# Patient Record
Sex: FEMALE | Race: WHITE | NOT HISPANIC OR LATINO | Employment: OTHER | URBAN - METROPOLITAN AREA
[De-identification: names, ages, dates, MRNs, and addresses within clinical notes are randomized per-mention and may not be internally consistent; named-entity substitution may affect disease eponyms.]

---

## 2020-11-17 ENCOUNTER — TELEPHONE (OUTPATIENT)
Dept: NEUROSURGERY | Facility: CLINIC | Age: 60
End: 2020-11-17

## 2020-11-17 NOTE — TELEPHONE ENCOUNTER
Provider:  Semaj  Caller: [facility]  Time of call:   3:49pm  Phone #:  [phone]  Surgery:    Surgery Date:    Last visit:     Next visit:     SHANNON:         Reason for call:  [facility] called stating the patient in there for an MRI currently. Wanting to know if it is safe for the patient to have an MRI due to the procedure and the coil the patient had placed. Would like a call back.

## 2021-01-19 ENCOUNTER — OFFICE VISIT (OUTPATIENT)
Dept: NEUROSURGERY | Facility: CLINIC | Age: 61
End: 2021-01-19

## 2021-01-19 VITALS
DIASTOLIC BLOOD PRESSURE: 84 MMHG | HEIGHT: 67 IN | HEART RATE: 83 BPM | BODY MASS INDEX: 23.1 KG/M2 | WEIGHT: 147.2 LBS | SYSTOLIC BLOOD PRESSURE: 124 MMHG | RESPIRATION RATE: 15 BRPM | OXYGEN SATURATION: 98 %

## 2021-01-19 DIAGNOSIS — I72.9 ANEURYSM (HCC): ICD-10-CM

## 2021-01-19 DIAGNOSIS — G44.229 CHRONIC TENSION-TYPE HEADACHE, NOT INTRACTABLE: Primary | ICD-10-CM

## 2021-01-19 PROCEDURE — 99204 OFFICE O/P NEW MOD 45 MIN: CPT | Performed by: PHYSICIAN ASSISTANT

## 2021-01-19 RX ORDER — ASPIRIN 81 MG/1
81 TABLET ORAL DAILY
COMMUNITY

## 2021-01-19 RX ORDER — UREA 10 %
LOTION (ML) TOPICAL
COMMUNITY

## 2021-01-19 NOTE — PROGRESS NOTES
Patient: Lisa Chua  : 1960    Primary Care Provider: Kiah Nixon APRN      Chief Complaint: Headache and right neck pain    History of Present Illness:       Patient is a very sweet 60-year-old female smoker who has a very colorful history with neurosurgery.  About a decade ago patient had a left ophthalmic artery aneurysm coiled.  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  Following those procedures patient was known to have right-sided trigeminal neuralgia and underwent a right-sided posterior fossa craniotomy for trigeminal nerve decompression.  Patient has not been seen back for close to 5 years.     Incision is well-healed in the back of her head but she complains of having a spell in  where she is having blurry vision and overall generalized fatigue this lasted for about 2 or 3 days she did not seek medical care at that time but since then started having headaches and right neck pain.    Patient states she gets a little bit of relief with ibuprofen and she alternates that with Tylenol to decrease amount of medicine she is taking.  Patient takes only an aspirin a day for her generalized medicines.    Patient represents with MRI for evaluation of her headaches.  I reviewed the MRI with Dr. Smith and as previously noted on historical MRIs patient does have a left occipital area stroke but no contrast-enhancing in the area of the aneurysms which would indicate no regrowth of the aneurysm.  Also reassured the patient that pipelines have never shown regrowth of any aneurysm when having a successfully deployed pipeline.    Review of Systems   Constitutional: Negative for activity change, appetite change, chills, fatigue and fever.   HENT: Negative for congestion, dental problem, ear pain, hearing loss, sinus pressure and tinnitus.    Eyes: Negative for pain and  "redness.   Respiratory: Negative for apnea, cough, shortness of breath and wheezing.    Cardiovascular: Negative for chest pain, palpitations and leg swelling.   Gastrointestinal: Negative for abdominal distention, abdominal pain, blood in stool, constipation, diarrhea, nausea and vomiting.   Endocrine: Negative for cold intolerance, heat intolerance and polyuria.   Genitourinary: Negative for enuresis, frequency and urgency.   Musculoskeletal: Positive for neck pain and neck stiffness.   Skin: Negative for color change and rash.   Neurological: Positive for headaches. Negative for dizziness, tremors, seizures, syncope, speech difficulty, weakness, light-headedness and numbness.   Psychiatric/Behavioral: Negative for behavioral problems and confusion. The patient is not nervous/anxious.        Past Medical History:     Past Medical History:   Diagnosis Date   • Arthritis    • Asthma    • Migraine        Family History:   History reviewed. No pertinent family history.    Social History:    reports that she has been smoking cigarettes. She has been smoking about 1.00 pack per day. She does not have any smokeless tobacco history on file.   SMOKING STATUS: I spent greater than 10 minutes educating the patient on smoking cessation, and discussed how smoking pertains to the particular disease process at hand.  The patient acknowledges understanding.      Surgical History:     Past Surgical History:   Procedure Laterality Date   • BRAIN SURGERY      x 13       Allergies:   Patient has no known allergies.    Physical Exam:    Vital Signs:/84   Pulse 83   Resp 15   Ht 170.2 cm (67\")   Wt 66.8 kg (147 lb 3.2 oz)   SpO2 98%   BMI 23.05 kg/m²    BMI: Body mass index is 23.05 kg/m².     GENERAL:           The patient is in no acute distress, and is able to answer all questions appropriately.    Neck:          Supple without lymphadenopathy    Cardiovascular:       Peripheral pulses 2+ at dorsalis pedis and anterior " tibialis    Lungs:         Breathing unlabored    Musculoskeletal:            strength is 5 out of 5 bilaterally.        Shoulder abduction is 5 out of 5.         Dorsiflexion is 5/5 Bilaterally       Plantarflexion is 5/5 bilaterally       Hip Flexion 5/5 bilaterally.         The patient´s gait is normal without antalgia.  No sign of Diaz's or clonus    Neurologic:          The patient is alert and oriented by 3.          Pupils are equal and reactive to light.         Visual fields are full.         Extraocular movements are intact without nystagmus.         There is no evidence of central motor drift. No facial droop.  No difficulty with rapid alternating movements.         Sensation is equal bilaterally with no deficit.           Reflexes:  2+ through out    CRANIAL NERVES:         Cranial nerve II: Visual fields are full to confrontation.       Cranial nerves III, IV and VI: PERRLA DC.  Extraocular movements are intact.  Nystagmus is not present.       Cranial nerve V: Facial sensation is intact to light touch.       Cranial nerve VII: Muscles of facial expression revealed no asymmetry.       Cranial nerve VIII: Hearing is intact to finger rub bilaterally.       Cranial nerve IX and X: Palate elevates symmetrically.        Cranial nerve XI: Shoulder shrug is intact.       Cranial nerve XII: Tongue is midline without evidence of Atrophy or fasciculation.    Incision is well-healed well approximated no sign of infection bleeding erythema    Medical Decision Making    Data Review:     MRI of the brain reviewed and discussed with Dr. Smith.  Dr. Mcgarry also reviewed said that the MRI looks stable as compared to the ones previously.  Nothing intracranial causing her headaches    Diagnosis:   Headache  History of intercerebral aneurysm x2  History of right-sided trigeminal neuralgia   Status post right posterior fossa craniotomy for decompression    Treatment Options:   Patient is currently doing well and  having signs and symptoms of headache.  Neurologist back home told her that she had a stroke on her MRI but unfortunately the stroke that he identified there is one that has been there and has been stable for some time.  There is nothing on her scan that shows she would be having headaches from anything intracranially.  I have referred her to Dr. Kate to discuss her headaches and management thereof.    If she were to continue have symptoms we would be happy to repeat an diagnostic angiogram, but it is not necessary at this time.    Patient will call us back on an as-needed basis    Patient's Body mass index is 23.05 kg/m². BMI is within normal parameters. No follow-up required..     Diagnosis Plan   1. Chronic tension-type headache, not intractable  Ambulatory Referral to Neurology   2. Aneurysm (CMS/[facility])

## 2025-06-08 ENCOUNTER — HOSPITAL ENCOUNTER (EMERGENCY)
Facility: HOSPITAL | Age: 65
Discharge: HOME | End: 2025-06-08
Attending: EMERGENCY MEDICINE | Admitting: EMERGENCY MEDICINE
Payer: MEDICARE

## 2025-06-08 ENCOUNTER — APPOINTMENT (OUTPATIENT)
Facility: HOSPITAL | Age: 65
End: 2025-06-08
Payer: MEDICARE

## 2025-06-08 VITALS
WEIGHT: 140 LBS | HEART RATE: 109 BPM | HEIGHT: 66 IN | DIASTOLIC BLOOD PRESSURE: 94 MMHG | BODY MASS INDEX: 22.5 KG/M2 | SYSTOLIC BLOOD PRESSURE: 145 MMHG | TEMPERATURE: 98.5 F | OXYGEN SATURATION: 95 % | RESPIRATION RATE: 18 BRPM

## 2025-06-08 DIAGNOSIS — S61.259A ANIMAL BITE OF FINGER, INITIAL ENCOUNTER: ICD-10-CM

## 2025-06-08 DIAGNOSIS — S62.631B OPEN DISPLACED FRACTURE OF DISTAL PHALANX OF LEFT INDEX FINGER, INITIAL ENCOUNTER: Primary | ICD-10-CM

## 2025-06-08 PROCEDURE — 25010000002 LIDOCAINE PF 1% 1 % SOLUTION: Performed by: EMERGENCY MEDICINE

## 2025-06-08 PROCEDURE — 73140 X-RAY EXAM OF FINGER(S): CPT

## 2025-06-08 PROCEDURE — 96365 THER/PROPH/DIAG IV INF INIT: CPT

## 2025-06-08 PROCEDURE — 90715 TDAP VACCINE 7 YRS/> IM: CPT | Performed by: EMERGENCY MEDICINE

## 2025-06-08 PROCEDURE — 25010000002 TETANUS-DIPHTH-ACELL PERTUSSIS 5-2.5-18.5 LF-MCG/0.5 SUSPENSION PREFILLED SYRINGE: Performed by: EMERGENCY MEDICINE

## 2025-06-08 PROCEDURE — 25010000002 AMPICILLIN-SULBACTAM PER 1.5 G: Performed by: EMERGENCY MEDICINE

## 2025-06-08 PROCEDURE — 90471 IMMUNIZATION ADMIN: CPT | Performed by: EMERGENCY MEDICINE

## 2025-06-08 PROCEDURE — 99283 EMERGENCY DEPT VISIT LOW MDM: CPT | Performed by: EMERGENCY MEDICINE

## 2025-06-08 RX ORDER — LIDOCAINE HYDROCHLORIDE 10 MG/ML
10 INJECTION, SOLUTION EPIDURAL; INFILTRATION; INTRACAUDAL; PERINEURAL ONCE
Status: COMPLETED | OUTPATIENT
Start: 2025-06-08 | End: 2025-06-08

## 2025-06-08 RX ORDER — ONDANSETRON 4 MG/1
4 TABLET, ORALLY DISINTEGRATING ORAL EVERY 8 HOURS PRN
Qty: 9 TABLET | Refills: 0 | Status: SHIPPED | OUTPATIENT
Start: 2025-06-08

## 2025-06-08 RX ORDER — HYDROCODONE BITARTRATE AND ACETAMINOPHEN 7.5; 325 MG/1; MG/1
1 TABLET ORAL EVERY 6 HOURS PRN
Qty: 12 TABLET | Refills: 0 | Status: SHIPPED | OUTPATIENT
Start: 2025-06-08

## 2025-06-08 RX ORDER — FLUCONAZOLE 150 MG/1
150 TABLET ORAL ONCE
Qty: 1 TABLET | Refills: 0 | Status: SHIPPED | OUTPATIENT
Start: 2025-06-08 | End: 2025-06-08

## 2025-06-08 RX ORDER — SODIUM CHLORIDE 0.9 % (FLUSH) 0.9 %
10 SYRINGE (ML) INJECTION AS NEEDED
Status: DISCONTINUED | OUTPATIENT
Start: 2025-06-08 | End: 2025-06-08 | Stop reason: HOSPADM

## 2025-06-08 RX ORDER — DOXYCYCLINE 100 MG/1
100 CAPSULE ORAL 2 TIMES DAILY
Qty: 10 CAPSULE | Refills: 0 | Status: SHIPPED | OUTPATIENT
Start: 2025-06-08 | End: 2025-06-13

## 2025-06-08 RX ADMIN — TETANUS TOXOID, REDUCED DIPHTHERIA TOXOID AND ACELLULAR PERTUSSIS VACCINE, ADSORBED 0.5 ML: 5; 2.5; 8; 8; 2.5 SUSPENSION INTRAMUSCULAR at 16:44

## 2025-06-08 RX ADMIN — LIDOCAINE HYDROCHLORIDE 10 ML: 10 INJECTION, SOLUTION EPIDURAL; INFILTRATION; INTRACAUDAL; PERINEURAL at 17:58

## 2025-06-08 RX ADMIN — AMPICILLIN SODIUM AND SULBACTAM SODIUM 3 G: 2; 1 INJECTION, POWDER, FOR SOLUTION INTRAMUSCULAR; INTRAVENOUS at 16:45

## 2025-06-08 NOTE — FSED PROVIDER NOTE
"Subjective  History of Present Illness:    Patient presents to the emergency department after she was excellently bit on her left index finger by her sister's dog.  The dog is up-to-date on all of his immunizations.  She states her last tetanus shot was greater than 5 years ago.  Describes pain in the distal aspect of the finger with large laceration.  Denies any other complaint      Nurses Notes reviewed and agree, including vitals, allergies, social history and prior medical history.     REVIEW OF SYSTEMS: All systems reviewed and not pertinent unless noted.  Review of Systems   Musculoskeletal:  Positive for arthralgias.   Skin:  Positive for wound.   All other systems reviewed and are negative.      Past Medical History:   Diagnosis Date    Arthritis     Asthma     Migraine        Allergies:    Patient has no known allergies.      Past Surgical History:   Procedure Laterality Date    BRAIN SURGERY      x 13         Social History     Socioeconomic History    Marital status:    Tobacco Use    Smoking status: Every Day     Current packs/day: 1.00     Types: Cigarettes         No family history on file.    Objective  Physical Exam:  /94   Pulse 109   Temp 98.5 °F (36.9 °C) (Oral)   Resp 18   Ht 167.6 cm (66\")   Wt 63.5 kg (140 lb)   SpO2 95%   BMI 22.60 kg/m²      Physical Exam  Vitals and nursing note reviewed.   Constitutional:       General: She is not in acute distress.     Appearance: Normal appearance. She is normal weight. She is not ill-appearing, toxic-appearing or diaphoretic.   HENT:      Head: Normocephalic and atraumatic.      Mouth/Throat:      Mouth: Mucous membranes are moist.   Eyes:      Extraocular Movements: Extraocular movements intact.      Conjunctiva/sclera: Conjunctivae normal.      Pupils: Pupils are equal, round, and reactive to light.   Cardiovascular:      Rate and Rhythm: Normal rate and regular rhythm.      Pulses: Normal pulses.      Heart sounds: Normal heart " sounds.   Pulmonary:      Effort: Pulmonary effort is normal.      Breath sounds: Normal breath sounds.   Musculoskeletal:      Comments: There is a large avulsion laceration to the distal aspect of the left index finger which involves transection of the nailbed of the proximal third of the nailbed.  Bone exposed.  Capillary refills less than 2 seconds at the distal tip.   Skin:     Capillary Refill: Capillary refill takes less than 2 seconds.   Neurological:      Mental Status: She is alert.   Psychiatric:         Mood and Affect: Mood normal.         Behavior: Behavior normal.         Thought Content: Thought content normal.         Judgment: Judgment normal.         Laceration Repair    Date/Time: 6/8/2025 5:44 PM    Performed by: Cesario Regan DO  Authorized by: Cesario Regan DO    Consent:     Consent obtained:  Verbal    Consent given by:  Patient    Risks discussed:  Infection and pain  Anesthesia:     Anesthesia method:  Nerve block    Block needle gauge:  27 G    Block anesthetic:  Lidocaine 1% w/o epi    Block technique:  Base of the finger    Block injection procedure:  Anatomic landmarks identified and introduced needle    Block outcome:  Anesthesia achieved  Laceration details:     Location:  Finger    Finger location:  L index finger    Length (cm):  2  Pre-procedure details:     Preparation:  Patient was prepped and draped in usual sterile fashion  Exploration:     Contaminated: yes    Treatment:     Area cleansed with:  Chlorhexidine    Amount of cleaning:  Extensive    Irrigation solution:  Sterile saline  Skin repair:     Repair method:  Sutures    Suture size:  4-0    Suture material:  Nylon    Suture technique:  Simple interrupted    Number of sutures:  5  Approximation:     Approximation:  Close  Repair type:     Repair type:  Intermediate  Post-procedure details:     Procedure completion:  Tolerated  Comments:      The nailbed was repaired with 4-0 nylon.  The initial distal aspect  of the nailbed was visualized after the nail was removed.  Laceration was repaired.  Dermabond was placed over the nailbed.  Covered with Vaseline gauze 4 x 4's and aluminum foam splint when this was held in place with Coban.      ED Course:         Lab Results (last 24 hours)       ** No results found for the last 24 hours. **             XR Finger 2+ View Left  Result Date: 6/8/2025  XR FINGER 2+ VW LEFT Date of Exam: 6/8/2025 4:46 PM EDT Indication: Dog bite to index finger Comparison: None available. Findings: There is soft tissue swelling at the tip of the index finger. There appears to be disruption of the nailbed. No radiopaque foreign body. There is an acute mildly displaced fracture through the distal phalangeal tuft. No additional fractures.     Impression: Impression: Soft tissue swelling and apparent disruption of the nailbed at the tip of the index finger; please note that nailbed disruption can predispose underlying bony infection; correlate with physical exam. Underlying fracture of the tuft. No radiopaque foreign  body. Electronically Signed: Sonny Tucker MD  6/8/2025 5:28 PM EDT  Workstation ID: [id number]         MDM  Number of Diagnoses or Management Options  Animal bite of finger, initial encounter  Open displaced fracture of distal phalanx of left index finger, initial encounter  Diagnosis management comments: Patient was evaluated and laceration was repaired.  This was a moderately complex laceration repair with nailbed involvement.  The patient was given commonsense instructions on wound care.  She was given a dose of Unasyn in the emergency department as this is an open fracture of the distal tip of the finger.  This was a dog bite.  Patient was discharged home with Augmentin.  She will follow-up with orthopedics on an outpatient basis and will return to the emergency department with any worsening symptoms or infection.        Medications   lidocaine PF 1% (XYLOCAINE) injection 10 mL (has no  administration in time range)   sodium chloride 0.9 % flush 10 mL (has no administration in time range)   ampicillin-sulbactam (UNASYN) 3 g in sodium chloride 0.9 % 100 mL MBP (3 g Intravenous New Bag 6/8/25 1645)   Tetanus-Diphth-Acell Pertussis (BOOSTRIX) injection 0.5 mL (0.5 mL Intramuscular Given 6/8/25 1644)       Data interpreted: Nursing notes reviewed, vital signs reviewed.  Labs independently interpreted by me (CBC, CMP, lipase, UA, troponin, ABG, lactic acid, procalcitonin).  Imaging independently interpreted by me (x-ray, CT scan).  EKG independently interpreted by me.  O2 saturation:    Counseling: Discussed the results above with the patient regarding need for admission or discharge.  Patient understands and agrees plan of care.      -----  ED Disposition       ED Disposition   Discharge    Condition   Stable    Comment   --             Final diagnoses:   Open displaced fracture of distal phalanx of left index finger, initial encounter   Animal bite of finger, initial encounter      Your Follow-Up Providers       Ivan Palmer MD In 1 week.    Specialty: Orthopedic Surgery  Follow up details: For wound re-check, For suture removal  [address]  [address]  [facility] [id number] [phone]               [facility] EMERGENCY DEPARTMENT [place].    Specialty: Emergency Medicine  Follow up details: As needed  [address]  [facility] [address] [phone]             Kiah Nixon APRN. Call in 2 days.    Specialty: Nurse Practitioner  [address] [facility] [address]  [phone]               [facility] EMERGENCY DEPARTMENT [place].    Specialty: Emergency Medicine  Follow up details: As needed  [address]  [facility] [address] [phone]                     Contact information for after-discharge care    Follow-up information has not been specified.                     Your medication list        START taking these medications        Instructions Last Dose Given Next Dose Due   amoxicillin-clavulanate 875-125 MG per tablet  Commonly known as: AUGMENTIN      Take 1 tablet by mouth 2 (Two) Times a Day for 10 days.       HYDROcodone-acetaminophen 7.5-325 MG per tablet  Commonly known as: NORCO      Take 1 tablet by mouth Every 6 (Six) Hours As Needed for Moderate Pain for up to 12 doses.              CONTINUE taking these medications        Instructions Last Dose Given Next Dose Due   aspirin 81 MG EC tablet      Take 81 mg by mouth Daily.       melatonin 1 MG tablet      Take  by mouth.                 Where to Get Your Medications        These medications were sent to [facility]/pharmacy #[id number] - [place], [place] - [address] - [phone]  - [phone] FX  [address] [facility] [address]      Hours: 24-hours Phone: [phone]   amoxicillin-clavulanate 875-125 MG per tablet  HYDROcodone-acetaminophen 7.5-325 MG per tablet